# Patient Record
Sex: MALE | Race: OTHER | HISPANIC OR LATINO | Employment: UNEMPLOYED | URBAN - METROPOLITAN AREA
[De-identification: names, ages, dates, MRNs, and addresses within clinical notes are randomized per-mention and may not be internally consistent; named-entity substitution may affect disease eponyms.]

---

## 2023-12-01 ENCOUNTER — HOSPITAL ENCOUNTER (EMERGENCY)
Facility: HOSPITAL | Age: 38
Discharge: HOME | End: 2023-12-01
Attending: EMERGENCY MEDICINE

## 2023-12-01 VITALS
BODY MASS INDEX: 21.17 KG/M2 | OXYGEN SATURATION: 99 % | RESPIRATION RATE: 20 BRPM | WEIGHT: 165 LBS | HEART RATE: 100 BPM | TEMPERATURE: 101 F | SYSTOLIC BLOOD PRESSURE: 131 MMHG | HEIGHT: 74 IN | DIASTOLIC BLOOD PRESSURE: 92 MMHG

## 2023-12-01 DIAGNOSIS — J10.1 INFLUENZA A: Primary | ICD-10-CM

## 2023-12-01 LAB
CTP QC/QA: YES
MOLECULAR STREP A: NEGATIVE
POC MOLECULAR INFLUENZA A AGN: POSITIVE
POC MOLECULAR INFLUENZA B AGN: NEGATIVE
SARS-COV-2 RDRP RESP QL NAA+PROBE: NEGATIVE

## 2023-12-01 PROCEDURE — 25000003 PHARM REV CODE 250: Performed by: PHYSICIAN ASSISTANT

## 2023-12-01 PROCEDURE — 87502 INFLUENZA DNA AMP PROBE: CPT

## 2023-12-01 PROCEDURE — 87651 STREP A DNA AMP PROBE: CPT

## 2023-12-01 PROCEDURE — 99284 EMERGENCY DEPT VISIT MOD MDM: CPT

## 2023-12-01 PROCEDURE — 87635 SARS-COV-2 COVID-19 AMP PRB: CPT | Performed by: EMERGENCY MEDICINE

## 2023-12-01 RX ORDER — PROMETHAZINE HYDROCHLORIDE AND DEXTROMETHORPHAN HYDROBROMIDE 6.25; 15 MG/5ML; MG/5ML
5 SYRUP ORAL EVERY 4 HOURS PRN
Qty: 118 ML | Refills: 0 | Status: SHIPPED | OUTPATIENT
Start: 2023-12-01 | End: 2023-12-11

## 2023-12-01 RX ORDER — OXYMETAZOLINE HCL 0.05 %
1 SPRAY, NON-AEROSOL (ML) NASAL 2 TIMES DAILY
Qty: 15 ML | Refills: 0 | Status: SHIPPED | OUTPATIENT
Start: 2023-12-01 | End: 2023-12-04

## 2023-12-01 RX ORDER — ACETAMINOPHEN 500 MG
1000 TABLET ORAL
Status: COMPLETED | OUTPATIENT
Start: 2023-12-01 | End: 2023-12-01

## 2023-12-01 RX ORDER — OSELTAMIVIR PHOSPHATE 75 MG/1
75 CAPSULE ORAL 2 TIMES DAILY
Qty: 10 CAPSULE | Refills: 0 | Status: SHIPPED | OUTPATIENT
Start: 2023-12-01 | End: 2023-12-06

## 2023-12-01 RX ORDER — PEDI MULTIVIT NO.27/FOLIC ACID 100 MCG
2 TABLET,CHEWABLE ORAL EVERY 4 HOURS PRN
Qty: 24 EACH | Refills: 0 | Status: SHIPPED | OUTPATIENT
Start: 2023-12-01 | End: 2023-12-08

## 2023-12-01 RX ADMIN — ACETAMINOPHEN 1000 MG: 500 TABLET ORAL at 07:12

## 2023-12-01 NOTE — ED PROVIDER NOTES
Encounter Date: 12/1/2023    SCRIBE #1 NOTE: I, Indira Nick, am scribing for, and in the presence of,  Andrei Faria PA-C. I have scribed the following portions of the note - Other sections scribed: HPI, ROS.       History     Chief Complaint   Patient presents with    Headache    Fever     Arrives to ER with complaints of head ache and fever x3 days. Reports taking tylenol last dose at 2100 with no improvements. Also reporting some sore throat, no n/v/d     This 38 y.o male, with no medical history, presents to the ED accompanied by a relative c/o an acute, constant fever, nasal congestion, cough and headache for the last 3x days. Pt reports taking Tylenol last night without any improvement. Pt denies sore throat, abdominal pain, nausea, emesis or any other associated symptoms.    The history is provided by the patient.     Review of patient's allergies indicates:  No Known Allergies  No past medical history on file.  No past surgical history on file.  No family history on file.     Review of Systems   Constitutional:  Positive for fever.   HENT:  Positive for congestion (nasal). Negative for ear pain, sore throat, trouble swallowing and voice change.    Respiratory:  Positive for cough. Negative for shortness of breath and wheezing.    Cardiovascular:  Negative for chest pain.   Gastrointestinal:  Negative for abdominal pain, diarrhea, nausea and vomiting.   Genitourinary:  Negative for dysuria, flank pain, frequency and urgency.   Musculoskeletal:  Negative for back pain and neck pain.   Skin:  Negative for rash.   Neurological:  Positive for headaches.   All other systems reviewed and are negative.      Physical Exam     Initial Vitals [12/01/23 0615]   BP Pulse Resp Temp SpO2   (!) 131/92 (!) 113 20 (!) 100.8 °F (38.2 °C) 98 %      MAP       --         Physical Exam    Nursing note and vitals reviewed.  Constitutional: He appears well-developed and well-nourished. He is not diaphoretic. No distress.    HENT:   Head: Atraumatic.   Right Ear: External ear normal.   Left Ear: External ear normal.   Mouth/Throat: Oropharynx is clear and moist.   Nasal congestion present.    Eyes: Conjunctivae are normal.   Neck: No tracheal deviation present.   Normal range of motion.  Cardiovascular:  Normal rate and regular rhythm.           Pulmonary/Chest: No accessory muscle usage or stridor. No tachypnea. No respiratory distress.   Musculoskeletal:      Cervical back: Normal range of motion.     Neurological: He has normal strength. He displays no tremor. He displays no seizure activity. Coordination and gait normal.   Skin: Skin is intact. Capillary refill takes less than 2 seconds. No cyanosis.         ED Course   Procedures  Labs Reviewed   POCT INFLUENZA A/B MOLECULAR - Abnormal; Notable for the following components:       Result Value    POC Molecular Influenza A Ag Positive (*)     All other components within normal limits   SARS-COV-2 RDRP GENE   POCT STREP A MOLECULAR          Imaging Results    None          Medications   acetaminophen tablet 1,000 mg (has no administration in time range)     Medical Decision Making  This is an emergent evaluation of a 9 y.o. male presenting to the ED for URI symptoms. Denies abdominal pain and emesis. Patient is non-toxic appearing and in no acute distress. Spectrum of symptoms most consistent with viral process. Influenza A positive. COVID19 negative. Low suspicion for bacterial PNA. No respiratory distress or hypoxia. No alternative source for symptoms on physical exam.     Tolerating PO. Remains well appearing. Discharged home with supportive care. I discussed the use of OTC medications for symptom control. I advised patient to maintain adequate hydration and advance diet as tolerated to maintain adequate nutrition.    I discussed the diagnosis, treatment plan, indications for return to the emergency department, and for expected follow-up. The patient/family/caretaker verbalized  an understanding. The patient/family/caretaker are asked if there are any questions or concerns. We discuss the case, until all issues are addressed to the patient/family/caretaker's satisfaction. Patient/family/caretaker understands and is agreeable to the plan.        Amount and/or Complexity of Data Reviewed  Labs: ordered.    Risk  OTC drugs.  Prescription drug management.            Scribe Attestation:   Scribe #1: I performed the above scribed service and the documentation accurately describes the services I performed. I attest to the accuracy of the note.                           I, Andrei Faria PA-C, personally performed the services described in this documentation. All medical record entries made by the scribe were at my direction and in my presence. I have reviewed the chart and agree that the record reflects my personal performance and is accurate and complete.     Clinical Impression:  Final diagnoses:  [J10.1] Influenza A (Primary)          ED Disposition Condition    Discharge Stable          ED Prescriptions       Medication Sig Dispense Start Date End Date Auth. Provider    benzocaine-menthoL 15-20 mg Lozg Follows directions on packaging 16 lozenge 12/1/2023 -- Andrei Faria PA-C    oxymetazoline (AFRIN) 0.05 % nasal spray 1 spray by Nasal route 2 (two) times daily. DO NOT EXCEED USE FOR MORE THAN 3 DAYS IN A ROW. for 3 days 15 mL 12/1/2023 12/4/2023 Andrei Faria PA-C    PE-DM-ASA/dnyics-NR-XP-ASA (KELSY-SELTZER PLUS DAY-NIGHT) 7.8-325 mg(d)/ 6. mg(nt) TEfS Take 2 capsules by mouth every 4 (four) hours as needed. Do not exceed more than 10 capsules in 24 hours. Do not exceed recommended dose. Children under 12 years of age:  DO NOT USE. 24 each 12/1/2023 12/8/2023 Andrei Faria PA-C    promethazine-dextromethorphan (PROMETHAZINE-DM) 6.25-15 mg/5 mL Syrp Take 5 mLs by mouth every 4 (four) hours as needed (cough). 118 mL 12/1/2023 12/11/2023 Andrei Faria PA-C     oseltamivir (TAMIFLU) 75 MG capsule Take 1 capsule (75 mg total) by mouth 2 (two) times daily. for 5 days 10 capsule 12/1/2023 12/6/2023 Andrei Faria PA-C          Follow-up Information       Follow up With Specialties Details Why Contact Info    [facility] -  Schedule an appointment as soon as possible for a visit in 1 day For reevaluation, AND to establish primary if you don't have a  [address] [place] [address]  [phone]      [facility] Emergency Medicine Go to  If symptoms worsen or new symptoms develop [address] [facility] [place] [id number] [phone]             Andrei Faria PA-C  12/01/23 0711

## 2023-12-01 NOTE — ED TRIAGE NOTES
39 yo male presents to the ED with c/o headache and fever with sore throat. Pt is AAO x 4 upon assessment; reports that he began experiencing symptoms for 3 days. Pt denies any other symptoms. NAD noted at this time. Plan of care is ongoing.

## 2023-12-01 NOTE — DISCHARGE INSTRUCTIONS
Kourtney por venir a nuestro Departamento de Emergencias hoy. Es importante recordar que algunos problemas son difíciles de diagnosticar y es posible que no se detecten pete guerrero primera visita. Asegúrese de hacer un seguimiento con guerrero médico de atención primaria.  Si no tiene анна, puede comunicarse con el que figura en guerrero documentación de carina o también puede llamar a la [facility] al [phone] para programar supa kieran con анна.    Regrese a la geraldine de emergencias con cualquier pregunta / inquietud, síntomas nuevos / preocupantes, empeoramiento o falta de mejora. No conduzca ni tome decisiones importantes pete 24 horas si ha recibido analgésicos, sedantes o fármacos que alteran el estado de ánimo pete guerrero visita a la geraldine de emergencias.